# Patient Record
(demographics unavailable — no encounter records)

---

## 2025-07-09 NOTE — CONSULT LETTER
[Dear  ___] : Dear  [unfilled], [Consult Letter:] : I had the pleasure of evaluating your patient, [unfilled]. [Consult Closing:] : Thank you very much for allowing me to participate in the care of this patient.  If you have any questions, please do not hesitate to contact me. [Please see my note below.] : Please see my note below. [FreeTextEntry3] : Nella Hartman MD Otolaryngology- Facial Plastics  [address] [place] [id number] (P) - [phone] (F) - [phone]

## 2025-07-09 NOTE — PHYSICAL EXAM
[de-identified] : right lower helical keloid 1 cm, left mid helical keloid x 2 (lower is 1.4 cm, superior is 8 mm) on anterior surface, posterior surface with 8 mm mid helical keloid

## 2025-07-09 NOTE — ASSESSMENT
[FreeTextEntry1] : Bilateral ear keloids - mobile photos taken today - r/b/a Co2 laser excision which would be performed in the office  - no swimming until fully healed - surgery booking form sent she would like to schedule in late August (shave right ear - 1 cm, left ear: 1.4 cm, 8mm, 8mm) total = 4 cm

## 2025-07-09 NOTE — END OF VISIT
[FreeTextEntry3] : I personally saw and examined CHRISTIANO GARSIA in detail. I spoke to GARETT Crowe regarding the assessment and plan of care. I performed the procedures and relevant physical exam. I have made changes to the body of the note wherever necessary and appropriate.

## 2025-07-09 NOTE — HISTORY OF PRESENT ILLNESS
[de-identified] : Ms. GARSIA is a 20 year female here with her sister bilateral ear keloids all from piercings she has had left ear lobe keloid removed in the past and sutures were used, no regrowth in that area current helical keloids have been there for over a year scheduled to study abroad in 2 weeks returning late August, leaving for school in August and will consider removal over the school breaks